# Patient Record
Sex: FEMALE | Race: WHITE | NOT HISPANIC OR LATINO | Employment: UNEMPLOYED | URBAN - METROPOLITAN AREA
[De-identification: names, ages, dates, MRNs, and addresses within clinical notes are randomized per-mention and may not be internally consistent; named-entity substitution may affect disease eponyms.]

---

## 2019-05-01 ENCOUNTER — HOSPITAL ENCOUNTER (OUTPATIENT)
Dept: CARDIOLOGY | Facility: HOSPITAL | Age: 34
Discharge: HOME | End: 2019-05-01
Attending: NURSE PRACTITIONER

## 2020-05-21 ENCOUNTER — OFFICE VISIT CONVERTED (OUTPATIENT)
Dept: NEUROLOGY | Facility: CLINIC | Age: 35
End: 2020-05-21
Attending: PSYCHIATRY & NEUROLOGY

## 2020-06-15 ENCOUNTER — HOSPITAL ENCOUNTER (OUTPATIENT)
Dept: MRI IMAGING | Facility: HOSPITAL | Age: 35
Discharge: HOME | End: 2020-06-15
Attending: PSYCHIATRY & NEUROLOGY

## 2020-06-15 LAB
CREAT BLD-MCNC: 0.5 MG/DL (ref 0.6–1.4)
GFR SERPLBLD BASED ON 1.73 SQ M-ARVRAT: >60 ML/MIN/{1.73_M2}

## 2020-06-24 ENCOUNTER — OFFICE VISIT CONVERTED (OUTPATIENT)
Dept: NEUROLOGY | Facility: CLINIC | Age: 35
End: 2020-06-24
Attending: PSYCHIATRY & NEUROLOGY

## 2020-06-24 ENCOUNTER — HOSPITAL ENCOUNTER (OUTPATIENT)
Dept: LAB | Facility: HOSPITAL | Age: 35
Discharge: HOME | End: 2020-06-24
Attending: PSYCHIATRY & NEUROLOGY

## 2020-06-24 LAB — VIT B12 SERPL-MCNC: 233 PG/ML (ref 211–911)

## 2020-06-25 LAB
CONV IMMUNOGLOBULIN G (IGG): 1097 MG/DL (ref 586–1602)
CONV IMMUNOGLOBULIN M (IGM): 53 MG/DL (ref 26–217)
IGA SERPL-MCNC: 605 MG/DL (ref 87–352)
PROT PATTERN SERPL IFE-IMP: ABNORMAL

## 2020-06-27 LAB — COPPER SERPL-MCNC: 141 UG/DL (ref 72–166)

## 2020-10-06 ENCOUNTER — HOSPITAL ENCOUNTER (OUTPATIENT)
Dept: CARDIOLOGY | Facility: HOSPITAL | Age: 35
Discharge: HOME | End: 2020-10-06

## 2021-05-10 NOTE — H&P
History and Physical      Patient Name: Silvana Lee   Patient ID: [id number]   Sex: Female   YOB: 1985    Referring Provider: Robe PEREZ    Visit Date: May 21, 2020    Provider: Aniceto Dyson MD   Location: [facility]   Location Address: [address] [place] [address]  [id number]   Location Phone: [phone]          Chief Complaint     New pt visit for neuralgia.       History Of Present Illness  Silvana Lee is a 35 year old /White female who presents today to [facility] today referred from Robe PEREZ.      35-year-old woman evaluated for episodes of gait abnormalities.  She states that the first spell occurred October 2018 in which she had pain in her feet and legs while walking and it lasted for 1 to 2 weeks.  It was painful for the sheets to touch her feet and legs.  There is no numbness.  She had a hard time walking.  She states that she had another spell February 19, 2019 that lasted for 3 months.  She states it again affected her legs and feet and she could not hardly walk and she had blood coordination.  She had no burning and tingling sensation at that time.  She states in April 1 week she started having the same symptoms again in her feet and now this time her hands.  She has had a burning sensations in her hands and it stayed there.  Is numbness and burning sensations as well in her feet and the knees below.  At times there is numbness in her abdomen that comes and goes.  She states that her gait has gotten worse.  Since April 19 she has difficulty in walking.  She has a wide-based gait she is afraid to go up and down the stairs because she is unsteady.  She has difficulty getting in and out of car as well as the chair.  She has no bowel or bladder involvement.    She was thought to have neuropathy.  She states that she has some word finding difficulty and sometimes the words are jumbled in her head.  She has had no  blindness, double vision, slurring of speech.       Past Medical History  Asthma; Hypertension, Benign Essential; Leg pain; Neuropathy; Seasonal allergies         Past Surgical History  Cesarian Section; Hernia; Tonsillectomy         Medication List  clonidine 0.2 mg/24 hr transdermal patch weekly; Cymbalta 60 mg oral capsule,delayed release(DR/EC); folic acid oral; hydroxyzine HCl 50 mg/mL intramuscular solution; lisinopril 20 mg oral tablet; metoprolol succinate 25 mg oral tablet extended release 24 hr; Mirena 20 mcg/24 hours (5 yrs) 52 mg intrauterine intrauterine device; Vitamin D2 1,250 mcg (50,000 unit) oral capsule; Xarelto 20 mg oral tablet; Zoloft 100 mg oral tablet; Zoloft 50 mg oral tablet         Allergy List  NO KNOWN DRUG ALLERGIES         Family Medical History  Heart Disease; Cancer, Unspecified; Family history of cancer; Family history of heart disease         Social History  Alcohol (Current some day); Homemaker; lives with children; lives with spouse; ; Recreational Drug Use (Never); Tobacco (Current every day)         Review of Systems  · Constitutional  o Denies  o : chills, excessive sweating, fatigue, fever, sycope/passing out, weight gain, weight loss  · Eyes  o Denies  o : changes in vision, blurry vision, double vision  · HENT  o Denies  o : loss of hearing, ringing in the ears, ear aches, sore throat, nasal congestion, sinus pain, nose bleeds, seasonal allergies  · Cardiovascular  o Admits  o : anemia  o Denies  o : blood clots, swollen legs, easy burising or bleeding, transfusions  · Respiratory  o Denies  o : shortness of breath, dry cough, productive cough, pneumonia, COPD  · Gastrointestinal  o Denies  o : difficulty swallowing, reflux  · Genitourinary  o Denies  o : incontinence  · Neurologic  o Admits  o : tremor, loss of balance, dizziness/vertigo, difficulty with sleep, numbness/tingling/paresthesia , difficulty with coordination, weakness  o Denies  o : headache, seizure,  stroke, falls, difficulty with dexterity  · Musculoskeletal  o Admits  o : muscle aches, joint pain, weakness, spasms  o Denies  o : neck stiffness/pain, swollen lymph nodes, sciatica, pain radiating in arm, pain radiating in leg, low back pain  · Endocrine  o Denies  o : diabetes, thyroid disorder  · Psychiatric  o Admits  o : anxiety, depression      Vitals  Date Time BP Position Site L\R Cuff Size HR RR TEMP (F) WT  HT  BMI kg/m2 BSA m2 O2 Sat HC       05/21/2020 02:27 PM        97.4               Physical Examination     She is alert, fluent, phasic, follows commands well.  Optic disks are normal, visual fields are full, EOMs full in all directions gaze, facial strength is full, soft palate elevation and tongue normal.  There is no weakness of the upper or lower extremities with muscle testing.  There is a tremor noted hands extended and finger-to-nose testing.  There is no problems with tapping her upper extremities but tapping is slow in her feet.  She has some incoordination.  Reflexes are symmetrical in the biceps, triceps and patellar's and there is no ankle clonus.  Plantars are flexors.  Sensation is abnormal with sensory level in the neck.  Vibration is decreased to the ankles.  Station and gait she walks with a wide-based gait.  She has difficulty getting out of the examining table and she is spastic.  She walks with a spastic gait.  She is able to tiptoe, heel walk and fernadno.  Heart is regular through normal and rate           Assessment  · Multiple sclerosis     340/G35  She has symptoms of central nervous system disease. I will do an MRI of the brain, cervical and thoracic spine with and without contrast and I will see her again in 2 weeks time for follow-up. I discussed with her that she should not be taking hot showers or expose herself to heat. I discussed with her about the diagnosis of multiple sclerosis is the working diagnosis.    45 minutes was spent for this moderate complexity visit and  more than half the time was spent face-to-face with the patient for examination, counseling, planning and testing.    Problems Reconciled  Plan  · Orders  o MRI brain wo then w contrast (77653) - 340/G35 - 05/21/2020  o MRI cervical spine wo then w contrast (02774) - 340/G35 - 05/21/2020  o MRI thoracic spine wo then w contrast (98362) - 340/G35 - 05/21/2020  · Medications  o Medications have been Reconciled  o Transition of Care or Provider Policy  · Instructions  o Encouraged to follow-up with Primary Care Provider for preventative care.  o Follow Up in 2 weeks.            Electronically Signed by: Aniceto Dyson MD -Author on May 21, 2020 04:08:53 PM

## 2021-05-13 NOTE — PROGRESS NOTES
Progress Note      Patient Name: Silvana Lee   Patient ID: [id number]   Sex: Female   YOB: 1985    Referring Provider: Robe PEREZ    Visit Date: June 24, 2020    Provider: Aniceto Dyson MD   Location: [facility]   Location Address: [address] [place] [address]  [id number]   Location Phone: [phone]          Chief Complaint     2 wk f/u post MRI's for MS.       History Of Present Illness  Silvana Lee is a 35 year old /White female who presents today to [facility] today referred from Robe PEREZ.      35-year-old woman here for follow-up of her MRI of the brain, cervical spine and thoracic spine.  I reviewed the reports and they are unremarkable.  Her main complaint is numbness and tingling in her arms, body, legs.  She has good days and bad days.  Today's a good day she can walk.  Other than today she has problems with her gait and that she feels unsteady.  She has no coordination problems today.  She states that she had 36 vitals taken by a hematologist oncologist 2 years ago and nothing was found.  She does not know if she has had B12 levels performed.       Past Medical History  Asthma; Hypertension, Benign Essential; Leg pain; Multiple Sclerosis; Neuropathy; Seasonal allergies         Past Surgical History  Cesarian Section; Hernia; Tonsillectomy         Medication List  clonidine 0.2 mg/24 hr transdermal patch weekly; Cymbalta 60 mg oral capsule,delayed release(DR/EC); folic acid oral; hydroxyzine HCl 50 mg/mL intramuscular solution; lisinopril 20 mg oral tablet; metoprolol succinate 25 mg oral tablet extended release 24 hr; Mirena 20 mcg/24 hours (5 yrs) 52 mg intrauterine intrauterine device; Vitamin D2 1,250 mcg (50,000 unit) oral capsule; Xarelto 20 mg oral tablet; Zoloft 100 mg oral tablet; Zoloft 50 mg oral tablet         Allergy List  NO KNOWN DRUG ALLERGIES         Family Medical History  Heart Disease; Cancer,  Unspecified; Family history of cancer; Family history of heart disease         Social History  Alcohol (Current some day); Homemaker; lives with children; lives with spouse; ; Recreational Drug Use (Never); Tobacco (Current every day)         Review of Systems  · Constitutional  o Admits  o : excessive sweating, fatigue  o Denies  o : chills, fever, sycope/passing out, weight gain, weight loss  · Eyes  o Denies  o : changes in vision, blurry vision, double vision  · HENT  o Admits  o : seasonal allergies  o Denies  o : loss of hearing, ringing in the ears, ear aches, sore throat, nasal congestion, sinus pain, nose bleeds  · Cardiovascular  o Denies  o : blood clots, swollen legs, anemia, easy burising or bleeding, transfusions  · Respiratory  o Denies  o : shortness of breath, dry cough, productive cough, pneumonia, COPD  · Gastrointestinal  o Denies  o : difficulty swallowing, reflux  · Genitourinary  o Denies  o : incontinence  · Neurologic  o Admits  o : tremor, loss of balance, dizziness/vertigo, numbness/tingling/paresthesia , difficulty with coordination, weakness  o Denies  o : headache, seizure, stroke, falls, difficulty with sleep, difficulty with dexterity  · Musculoskeletal  o Admits  o : weakness, spasms  o Denies  o : neck stiffness/pain, swollen lymph nodes, muscle aches, joint pain, sciatica, pain radiating in arm, pain radiating in leg, low back pain  · Endocrine  o Denies  o : diabetes, thyroid disorder  · Psychiatric  o Admits  o : anxiety, depression      Vitals  Date Time BP Position Site L\R Cuff Size HR RR TEMP (F) WT  HT  BMI kg/m2 BSA m2 O2 Sat HC       06/24/2020 01:54 PM        96.2               Physical Examination     She is alert, fluent, phasic, follows commands well.  There is no weakness of the upper or lower extremities in these muscle testing.  Reflexes are normoactive and symmetrical biceps, triceps, patellar's and absent in the ankles.  Sensation is decreased to pinprick  in the lower up to the thoracic spine.  Upper extremity there is a stocking distribution to the elbows.  Station and gait she is able to tiptoe, heel walk, a has slight difficulty with tandem today.  Tapping in both hands is intact and rapid altering movements are intact.  Tapping in the left foot is slower than the right foot.           Assessment  · Gait disturbance     781.2/R26.9  · Numbness and tingling       Anesthesia of skin     782.0/R20.0  Paresthesia of skin     782.0/R20.2  I told her that I will send her to [facility] peripheral neuropathy clinic since I am unable to make a diagnosis. I will obtain B12, folate, copper level and immunofixation electrophoresis. She does not want other work-up to be performed at this time. She is going to see a rheumatologist as well.    25 minutes was spent for this moderate complexity visit more than half the time spent face-to-face with the patient for examination, counseling, planning and recommendations    Problems Reconciled  Plan  · Orders  o NEUROLOGY CONSULTATION. (NEURO) - 781.2/R26.9, 782.0/R20.0, 782.0/R20.2 - 06/24/2020   [facility] peripheral neuropathy clinic to see   o Vitamin B12 level (67373) - 781.2/R26.9, 782.0/R20.0, 782.0/R20.2 - 06/24/2020  o Immunofixation electrophoresis; serum (87190) - 781.2/R26.9, 782.0/R20.0, 782.0/R20.2 - 06/24/2020  o Copper level (96891) - 781.2/R26.9, 782.0/R20.0, 782.0/R20.2 - 06/24/2020  · Medications  o Medications have been Reconciled  o Transition of Care or Provider Policy  · Instructions  o Encouraged to follow-up with Primary Care Provider for preventative care.            Electronically Signed by: Aniceto Dyson MD -Author on June 24, 2020 02:52:20 PM

## 2021-05-15 VITALS — TEMPERATURE: 96.2 F

## 2021-05-15 VITALS — TEMPERATURE: 97.4 F

## 2022-03-16 ENCOUNTER — LAB (OUTPATIENT)
Dept: LAB | Facility: HOSPITAL | Age: 37
End: 2022-03-16

## 2022-03-16 ENCOUNTER — TRANSCRIBE ORDERS (OUTPATIENT)
Dept: LAB | Facility: HOSPITAL | Age: 37
End: 2022-03-16

## 2022-03-16 DIAGNOSIS — Z97.5 PRESENCE OF INTRAUTERINE CONTRACEPTIVE DEVICE: Primary | ICD-10-CM

## 2022-03-16 DIAGNOSIS — Z97.5 PRESENCE OF INTRAUTERINE CONTRACEPTIVE DEVICE: ICD-10-CM

## 2022-03-16 LAB — HCG SERPL QL: NEGATIVE

## 2022-03-16 PROCEDURE — 36415 COLL VENOUS BLD VENIPUNCTURE: CPT

## 2022-03-16 PROCEDURE — 84703 CHORIONIC GONADOTROPIN ASSAY: CPT

## 2022-07-12 ENCOUNTER — APPOINTMENT (OUTPATIENT)
Dept: ONCOLOGY | Facility: HOSPITAL | Age: 37
End: 2022-07-12

## 2023-10-04 ENCOUNTER — OFFICE VISIT (OUTPATIENT)
Dept: SLEEP MEDICINE | Facility: HOSPITAL | Age: 38
End: 2023-10-04
Payer: COMMERCIAL

## 2023-10-04 VITALS
BODY MASS INDEX: 31.02 KG/M2 | WEIGHT: 193 LBS | HEIGHT: 66 IN | HEART RATE: 66 BPM | DIASTOLIC BLOOD PRESSURE: 80 MMHG | OXYGEN SATURATION: 99 % | SYSTOLIC BLOOD PRESSURE: 122 MMHG

## 2023-10-04 DIAGNOSIS — E66.9 OBESITY (BMI 30-39.9): ICD-10-CM

## 2023-10-04 DIAGNOSIS — R29.818 SUSPECTED SLEEP APNEA: Primary | ICD-10-CM

## 2023-10-04 DIAGNOSIS — R06.83 SNORING: ICD-10-CM

## 2023-10-04 DIAGNOSIS — I10 BENIGN ESSENTIAL HTN: ICD-10-CM

## 2023-10-04 DIAGNOSIS — R35.1 NOCTURIA: ICD-10-CM

## 2023-10-04 PROCEDURE — G0463 HOSPITAL OUTPT CLINIC VISIT: HCPCS

## 2023-10-04 RX ORDER — ERGOCALCIFEROL 1.25 MG/1
CAPSULE ORAL
COMMUNITY
Start: 2023-07-12

## 2023-10-04 RX ORDER — GABAPENTIN 600 MG/1
300 TABLET ORAL 3 TIMES DAILY
COMMUNITY

## 2023-10-04 RX ORDER — CALCIUM CARBONATE 300MG(750)
1 TABLET,CHEWABLE ORAL DAILY
COMMUNITY

## 2023-10-04 NOTE — PROGRESS NOTES
Sleep Consultation    Patient Name: Silvana Lee  Age/Sex: 38 y.o. female  : 1985  MRN: [id number]    Date of Encounter Visit: 10/04/2023  Encounter Provider: Pamela Saab MD  Referring Provider: Pamela Saab MD  Place of Service: [facility]  Patient Care Team:  Julia Mccarty APRN as PCP - General (Family Medicine)    Subjective:     Reason for Consult: SAWYER    History of Present Illness:  Silvana Lee is a 38 y.o. female is here for evaluation of SAWYER .    Patient complains of daytime fatigue and sleepiness with an Gilby Sleepiness Scale (ESS) of 10.  Patient complains of daytime fatigue and sleepiness with worsening symptoms with a 30 pounds weight gain  Loud snoring neuropathy and was witnessed apnea waking up gasping for breath, frequently tripping at night, some leg discomfort was possible restless leg syndrome  Patient has difficulty staying asleep with frequent awakenings and positive nocturia  Patient denies any cataplexy, sleep paralysis or other symptoms to suggest narcolepsy.  Patient denies any parasomnias.  Denies any history of seizure disorder or recent head trauma.  Patient spends adequate amount of time in bed with no evidence of sleep restriction or improper sleep hygiene.  Bedtime is around 9 PM wake up time 630 with 9 hours of sleep in between, sleep onset 20-30 minutes, still wakes up feeling tired.  Caffeine intake is usually 6 caffeinated beverages per day, 2 alcoholic beverages per day, positive smoking, no illicit substance abuse comorbidities include: Hypertension, anxiety and depression    Review of Systems:   A twelve-system review was conducted and was negative except for the following: Fatigue, anxiety and depression       Past Medical History:  Past Medical History:   Diagnosis Date    Anxiety     Blood clotting disorder     Depression     Hyperlipemia     Hypertension     Neuropathy        Past Surgical History:   Procedure Laterality Date      SECTION      HERNIA MESH REMOVAL      TONSILLECTOMY         Home Medications:     Current Outpatient Medications:     cetirizine (zyrTEC) 10 MG tablet, Take 1 tablet by mouth Daily., Disp: , Rfl:     cloNIDine (CATAPRES) 0.2 MG tablet, Take 1 tablet by mouth Daily., Disp: , Rfl:     folic acid (FOLVITE) 1 MG tablet, Take 1 tablet by mouth Daily., Disp: , Rfl:     gabapentin (NEURONTIN) 600 MG tablet, Take 0.5 tablets by mouth 3 (Three) Times a Day., Disp: , Rfl:     Levonorgestrel (MIRENA) 20 MCG/DAY intrauterine device IUD, , Disp: , Rfl:     lisinopril (PRINIVIL,ZESTRIL) 20 MG tablet, Take 1 tablet by mouth Daily., Disp: , Rfl:     Magnesium 400 MG tablet, Take 1 tablet by mouth Daily., Disp: , Rfl:     magnesium oxide (MAG-OX) 400 MG tablet, Take  by mouth., Disp: , Rfl:     metoprolol succinate XL (TOPROL-XL) 25 MG 24 hr tablet, Take 1 tablet by mouth Daily., Disp: , Rfl:     PROBIOTIC PRODUCT PO, Take  by mouth., Disp: , Rfl:     rivaroxaban (XARELTO) 20 MG tablet, Take 1 tablet by mouth Daily., Disp: 30 tablet, Rfl: 5    rosuvastatin (CRESTOR) 5 MG tablet, Take 1 tablet by mouth Daily., Disp: 30 tablet, Rfl: 11    sertraline (ZOLOFT) 100 MG tablet, Take 1.5 tablets by mouth Daily., Disp: 45 tablet, Rfl: 5    sertraline (ZOLOFT) 50 MG tablet, 1 tablet., Disp: , Rfl:     VIT B12-METHIONINE-INOS-CHOL IM, Inject  into the appropriate muscle as directed by prescriber., Disp: , Rfl:     vitamin D (ERGOCALCIFEROL) 1.25 MG (53657 UT) capsule capsule, TAKE 1 CAPSULE BY MOUTH ONCE WEEKLY AS DIRECTED, Disp: , Rfl:     Allergies:  No Known Allergies    Past Social History:  Social History     Socioeconomic History    Marital status:    Tobacco Use    Smoking status: Every Day     Types: Cigarettes    Smokeless tobacco: Current   Vaping Use    Vaping Use: Never used   Substance and Sexual Activity    Alcohol use: Yes    Drug use: Never    Sexual activity: Defer       Past Family History:  History  "reviewed. No pertinent family history.  No family history of sleep apnea  Objective:        Vital Signs:   Visit Vitals  /80   Pulse 66   Ht 167 cm (65.75\")   Wt 87.5 kg (193 lb)   SpO2 99%   BMI 31.39 kg/m²     Wt Readings from Last 3 Encounters:   10/04/23 87.5 kg (193 lb)   09/28/23 79.4 kg (175 lb)     Neck Circumference: 16.5 inches    Physical Exam:   GEN:  No acute distress, alert, cooperative, well developed   EYES:   Sclerae clear. No icterus. PERRL. Normal EOM  ENT:   External ears/nose normal, no oral lesions, no thrush, mucous membranes moist, Septum midline. Mallampati III airway. With slightly swollen uvula    NECK:  Supple, midline trachea, no JVD  LUNGS: Normal chest on inspection, CTAB, no wheezes. No rhonchi. No crackles. Respirations regular, even and unlabored.   CV:  Regular rhythm and rate. Normal S1/S2. No murmurs, gallops, or rubs noted.  ABD:  Soft, nontender and nondistended. Normal bowel sounds. No guarding  EXT:  Moves all extremities well. No cyanosis. No redness. No edema.   Skin: Dry, intact, no bleeding      Diagnostic Data:  No prior studies to review     Assessment and Plan:       ICD-10-CM ICD-9-CM   1. Suspected sleep apnea  R29.818 781.99   2. Snoring  R06.83 786.09   3. Nocturia  R35.1 788.43   4. Obesity (BMI 30-39.9)  E66.9 278.00   5. Benign essential HTN  I10 401.1       Recommendations:     Patient is agreeable for sleep testing and for CPAP treatment  We will order a home sleep study, will initiate the treatment even for a mild case of sleep apnea since the patient is not symptomatic    Patient was educated in depth about SAWYER and cardiovascular consequences if left untreated, including but not limited to CHF, CAD, arrhythmias, CVA, and/or HTN. Education also provided about the diagnostic tools for SAWYER, including the polysomnography and the treatment modalities, including the CPAP.     If patient has obstructive sleep apnea the recommend treatment is CPAP and will " start CPAP and patient will follow up within 31-90 days after starting CPAP for compliance review.   Will address alternative treatment option if intolerant to CPAP     Adherence to the CPAP is a key factor in successful treatment of SAWYER and the patient was encouraged to contact us in case of problem with the CPAP or the mask that can limit the tolerance of the compliance with the therapy.    Patient was educated about the impact of obesity on sleep apnea and the benefit of weight loss and weight loss was recommended    Orders Placed This Encounter   Procedures    Home Sleep Study     No orders of the defined types were placed in this encounter.     Return in about 3 months (around 1/4/2024).    Pamela Saab MD   [facility]   10/04/23  13:52 EDT    Dictated utilizing Dragon dictation

## 2023-10-25 ENCOUNTER — HOSPITAL ENCOUNTER (OUTPATIENT)
Dept: SLEEP MEDICINE | Facility: HOSPITAL | Age: 38
End: 2023-10-25
Payer: COMMERCIAL

## 2023-10-25 DIAGNOSIS — R29.818 SUSPECTED SLEEP APNEA: ICD-10-CM

## 2023-10-25 DIAGNOSIS — E66.9 OBESITY (BMI 30-39.9): ICD-10-CM

## 2023-10-25 DIAGNOSIS — R06.83 SNORING: ICD-10-CM

## 2023-10-25 DIAGNOSIS — I10 BENIGN ESSENTIAL HTN: ICD-10-CM

## 2023-10-25 DIAGNOSIS — R35.1 NOCTURIA: ICD-10-CM

## 2023-10-25 PROCEDURE — 95806 SLEEP STUDY UNATT&RESP EFFT: CPT

## 2023-11-03 DIAGNOSIS — G47.33 OSA (OBSTRUCTIVE SLEEP APNEA): Primary | ICD-10-CM

## 2023-11-13 ENCOUNTER — TELEPHONE (OUTPATIENT)
Dept: SLEEP MEDICINE | Facility: HOSPITAL | Age: 38
End: 2023-11-13
Payer: COMMERCIAL

## 2024-01-23 ENCOUNTER — OFFICE VISIT (OUTPATIENT)
Dept: SLEEP MEDICINE | Facility: HOSPITAL | Age: 39
End: 2024-01-23
Payer: COMMERCIAL

## 2024-01-23 VITALS
HEIGHT: 66 IN | HEART RATE: 63 BPM | OXYGEN SATURATION: 98 % | SYSTOLIC BLOOD PRESSURE: 153 MMHG | DIASTOLIC BLOOD PRESSURE: 86 MMHG | BODY MASS INDEX: 30.53 KG/M2 | WEIGHT: 190 LBS

## 2024-01-23 DIAGNOSIS — I10 BENIGN ESSENTIAL HTN: ICD-10-CM

## 2024-01-23 DIAGNOSIS — E66.9 OBESITY (BMI 30-39.9): ICD-10-CM

## 2024-01-23 DIAGNOSIS — G47.34 SLEEP RELATED HYPOXIA: ICD-10-CM

## 2024-01-23 DIAGNOSIS — G47.33 OSA ON CPAP: Primary | ICD-10-CM

## 2024-01-23 PROCEDURE — G0463 HOSPITAL OUTPT CLINIC VISIT: HCPCS

## 2024-01-23 RX ORDER — CYANOCOBALAMIN, ISOPROPYL ALCOHOL 1000MCG/ML
1000 KIT INJECTION
COMMUNITY
Start: 2023-10-24

## 2024-01-23 RX ORDER — CYANOCOBALAMIN 1000 UG/ML
INJECTION, SOLUTION INTRAMUSCULAR; SUBCUTANEOUS
COMMUNITY
Start: 2023-10-24

## 2024-01-23 NOTE — PROGRESS NOTES
Sleep Disorder Follow Up    Patient Name: Silvana Lee  Age/Sex: 38 y.o. female  : 1985  MRN: [id number]    Date of Encounter Visit: 24   Referring Provider: No ref. provider found  Place of Service: [facility]  Patient Care Team:  Julia Mccarty APRN as PCP - General (Family Medicine)    PROBLEM LIST:  SAWYER  Sleep-related hypoxemia  Obesity  Hypertension    History of Present Illness:  Silvana Lee is a 38 y.o. female who is here for follow up on SAWYER. Patient was last seen in the office on 10/4/2023.  Since last visit, patient had a home sleep study that was done on 10/25/2023 that showed mild sleep apnea with AHI of 8.8 however with severe supine index of 31.8 along with significant hypoxemia during the cluster and patient was started on auto CPAP 6-20  Patient had a follow-up overnight oximetry on the CPAP that showed good oxygenation throughout after excluding the artifact over the first third of that overnight oximetry  .  Patient uses machine every night with no complaints from the mask or the pressure.  She is sleeping deeper , less sleep fragmentation, she still tosses and turn at night, but she is gradually adapting   Patient uses a fullface mask, which does  fit well. Patient mild to moderate air leak.  Positive for dry mouth.   Patient sleeps better and has a deeper sleep with the machine and feels more energy during the day time.  Currently on 6-20 cm H20.  [place] Sleepiness Scale (ESS) is 5.  Compliance download was reviewed and documented below.  Weight is down by 3 pounds since last visit.  Other comorbidities include hypertension, anxiety, and depression    Review of Systems:   A ten-system review was conducted and was negative except for the following: Dry mouth, abdominal bloating, anxiety and depression.        Past Medical History:  Past medical, surgical, social, and family history, except as mentioned above, was unchanged from the last visit.     Past  Medical History:   Diagnosis Date    Anxiety     Blood clotting disorder     Depression     Hyperlipemia     Hypertension     Neuropathy        Past Surgical History:   Procedure Laterality Date     SECTION      HERNIA MESH REMOVAL      TONSILLECTOMY         Home Medications:     Current Outpatient Medications:     cetirizine (zyrTEC) 10 MG tablet, Take 1 tablet by mouth Daily., Disp: , Rfl:     cloNIDine (CATAPRES) 0.2 MG tablet, Take 1 tablet by mouth Daily., Disp: , Rfl:     Cyanocobalamin (B-12 Compliance Injection) 1000 MCG/ML kit, 1 mL., Disp: , Rfl:     cyanocobalamin 1000 MCG/ML injection, INJECT 1 VIAL INTRAMUSCULARLY AS DIRECTED FOR 30 DAYS, Disp: , Rfl:     folic acid (FOLVITE) 1 MG tablet, Take 1 tablet by mouth Daily., Disp: , Rfl:     gabapentin (NEURONTIN) 600 MG tablet, Take 0.5 tablets by mouth 3 (Three) Times a Day., Disp: , Rfl:     Levonorgestrel (MIRENA) 20 MCG/DAY intrauterine device IUD, , Disp: , Rfl:     lisinopril (PRINIVIL,ZESTRIL) 20 MG tablet, Take 1 tablet by mouth Daily., Disp: , Rfl:     Magnesium 400 MG tablet, Take 1 tablet by mouth Daily., Disp: , Rfl:     metoprolol succinate XL (TOPROL-XL) 25 MG 24 hr tablet, Take 1 tablet by mouth Daily., Disp: , Rfl:     PROBIOTIC PRODUCT PO, Take  by mouth., Disp: , Rfl:     rosuvastatin (CRESTOR) 5 MG tablet, Take 1 tablet by mouth Daily., Disp: 30 tablet, Rfl: 11    VIT B12-METHIONINE-INOS-CHOL IM, Inject  into the appropriate muscle as directed by prescriber., Disp: , Rfl:     vitamin D (ERGOCALCIFEROL) 1.25 MG (25990 UT) capsule capsule, TAKE 1 CAPSULE BY MOUTH ONCE WEEKLY AS DIRECTED, Disp: , Rfl:     magnesium oxide (MAG-OX) 400 MG tablet, Take  by mouth., Disp: , Rfl:     rivaroxaban (XARELTO) 20 MG tablet, Take 1 tablet by mouth Daily., Disp: 30 tablet, Rfl: 5    sertraline (ZOLOFT) 100 MG tablet, Take 1.5 tablets by mouth Daily., Disp: 45 tablet, Rfl: 5    sertraline (ZOLOFT) 50 MG tablet, 1 tablet., Disp: , Rfl:  "    Allergies:  No Known Allergies    Past Social History:  Social History     Socioeconomic History    Marital status:    Tobacco Use    Smoking status: Every Day     Types: Cigarettes    Smokeless tobacco: Current   Vaping Use    Vaping Use: Never used   Substance and Sexual Activity    Alcohol use: Yes    Drug use: Never    Sexual activity: Defer       Past Family History:  History reviewed. No pertinent family history.      Objective:        Vital Signs:   Visit Vitals  /86   Pulse 63   Ht 167 cm (65.75\")   Wt 86.2 kg (190 lb)   SpO2 98%   BMI 30.90 kg/m²     Wt Readings from Last 3 Encounters:   01/23/24 86.2 kg (190 lb)   10/04/23 87.5 kg (193 lb)   09/28/23 79.4 kg (175 lb)          Physical Exam:   GEN:  No acute distress, alert, cooperative, well developed   EYES:   Sclerae clear. No icterus. PERRL. Normal EOM  ENT:   External ears/nose normal, no oral lesions, no thrush, mucous membranes moist, Septum midline. Mallampati III airway. With slightly swollen uvula    NECK:  Supple, midline trachea, no JVD  LUNGS: Normal chest on inspection, CTAB, no wheezes. No rhonchi. No crackles. Respirations regular, even and unlabored.   CV:  Regular rhythm and rate. Normal S1/S2. No murmurs, gallops, or rubs noted.  ABD:  Soft, nontender and nondistended. Normal bowel sounds. No guarding  EXT:  Moves all extremities well. No cyanosis. No redness. No edema.   Skin: Dry, intact, no bleeding    Diagnostic Data:  Home sleep study done on 10/25/2023       Respiratory Disturbance Events:     Reported weight on the night of the study was 87.5 kg with a BMI of 31.4  Total recording time 435.8-minute with a monitoring time 352.8 minutes  Respiratory summary was positive for mild overall sleep apnea with AHI of 8.8 however with severe supine index of 31.8 associated with significant hypoxemia.  A edmund desaturation was down to 71% with 29 minutes spent in the hypoxemic range and these were no artifacts.  Percentage of " snoring 32.9%  Heart rate was within normal range  Impression:    Mild sleep apnea with severe supine component  Sleep-related hypoxemia with significant desaturation in the supine position  Plan:  Despite the fact that this is a mild sleep apnea, it was pretty severe with significant hypoxemia in the supine sleep and in a patient who is symptomatic and treatment is still recommended  Initiate auto CPAP 6-20  Overnight oximetry on the CPAP    Overnight oximetry on CPAP/room air 12/15/2023      Compliance download from 12/12/2023 - 1/10/2024 showed 97% adherence with average nightly use of 5 hours and 26 minutes  Patient is on auto CPAP 6-20 with a mean/95th percentile pressure of 11.8/15.1 cm H2O  Air leak is mild to moderate with a median/95th percentile of 10.9/24.8 L/min  SAWYER is well-controlled with residual AHI of 1.0      Assessment and Plan:       ICD-10-CM ICD-9-CM   1. SAWYER on CPAP  G47.33 327.23   2. Sleep related hypoxia  G47.34 327.24   3. Benign essential HTN  I10 401.1   4. Obesity (BMI 30-39.9)  E66.9 278.00       Recommendations:   I did review with the patient in details the results of the home sleep study and pointed out all the abnormal finding, I also shared with the patient my concern that the home sleep study probably underestimated the severity of her sleep apnea overall  We did review the download from the CPAP machine as well as the overnight oximetry  Patient is compliant with the CPAP as evident from the compliance download  Patient is getting both clinical and subjective benefit from the use of the CPAP machine  The CPAP machine is effective in controlling the underlying sleep apnea  CPAP is strongly recommended to be continued  Patient to continue work on the weight loss  Will adjust the pressure to the new range of 10-20  Continue with a yearly follow-up or sooner as needed     Orders Placed This Encounter   Procedures    PAP Therapy    SCANNED - PULMONARY RESULTS    SCANNED - PULMONARY  RESULTS     No orders of the defined types were placed in this encounter.    Return in about 1 year (around 1/23/2025).    Pamela Saab MD   [facility]   01/23/24  09:32 EST    Dictated utilizing Dragon dictation

## 2025-02-07 ENCOUNTER — TRANSCRIBE ORDERS (OUTPATIENT)
Dept: ADMINISTRATIVE | Facility: HOSPITAL | Age: 40
End: 2025-02-07
Payer: COMMERCIAL

## 2025-02-07 DIAGNOSIS — R92.30 DENSE BREAST: Primary | ICD-10-CM

## 2025-02-07 DIAGNOSIS — N64.4 MASTODYNIA: ICD-10-CM

## 2025-05-19 ENCOUNTER — LAB (OUTPATIENT)
Dept: LAB | Facility: HOSPITAL | Age: 40
End: 2025-05-19
Payer: COMMERCIAL

## 2025-05-19 ENCOUNTER — TRANSCRIBE ORDERS (OUTPATIENT)
Dept: LAB | Facility: HOSPITAL | Age: 40
End: 2025-05-19
Payer: COMMERCIAL

## 2025-05-19 DIAGNOSIS — M79.2 NEURITIS: ICD-10-CM

## 2025-05-19 DIAGNOSIS — G60.9 PERIPHERAL NEUROPATHY, IDIOPATHIC: ICD-10-CM

## 2025-05-19 DIAGNOSIS — G60.9 PERIPHERAL NEUROPATHY, IDIOPATHIC: Primary | ICD-10-CM

## 2025-05-19 LAB — GLUCOSE BLD-MCNC: 123 MG/DL (ref 74–155)

## 2025-05-19 PROCEDURE — 85613 RUSSELL VIPER VENOM DILUTED: CPT

## 2025-05-19 PROCEDURE — 85705 THROMBOPLASTIN INHIBITION: CPT

## 2025-05-19 PROCEDURE — 86611 BARTONELLA ANTIBODY: CPT

## 2025-05-19 PROCEDURE — 86788 WEST NILE VIRUS AB IGM: CPT

## 2025-05-19 PROCEDURE — 85025 COMPLETE CBC W/AUTO DIFF WBC: CPT

## 2025-05-19 PROCEDURE — 83695 ASSAY OF LIPOPROTEIN(A): CPT

## 2025-05-19 PROCEDURE — 84443 ASSAY THYROID STIM HORMONE: CPT

## 2025-05-19 PROCEDURE — 86769 SARS-COV-2 COVID-19 ANTIBODY: CPT

## 2025-05-19 PROCEDURE — 84439 ASSAY OF FREE THYROXINE: CPT

## 2025-05-19 PROCEDURE — 85670 THROMBIN TIME PLASMA: CPT

## 2025-05-19 PROCEDURE — 82607 VITAMIN B-12: CPT

## 2025-05-19 PROCEDURE — 86431 RHEUMATOID FACTOR QUANT: CPT

## 2025-05-19 PROCEDURE — 86618 LYME DISEASE ANTIBODY: CPT

## 2025-05-19 PROCEDURE — 86753 PROTOZOA ANTIBODY NOS: CPT

## 2025-05-19 PROCEDURE — 82306 VITAMIN D 25 HYDROXY: CPT

## 2025-05-19 PROCEDURE — 85732 THROMBOPLASTIN TIME PARTIAL: CPT

## 2025-05-19 PROCEDURE — 86666 EHRLICHIA ANTIBODY: CPT

## 2025-05-19 PROCEDURE — 86038 ANTINUCLEAR ANTIBODIES: CPT

## 2025-05-19 PROCEDURE — 83036 HEMOGLOBIN GLYCOSYLATED A1C: CPT

## 2025-05-19 PROCEDURE — 86789 WEST NILE VIRUS ANTIBODY: CPT

## 2025-05-19 PROCEDURE — 83090 ASSAY OF HOMOCYSTEINE: CPT

## 2025-05-19 PROCEDURE — 36415 COLL VENOUS BLD VENIPUNCTURE: CPT

## 2025-05-19 PROCEDURE — 82746 ASSAY OF FOLIC ACID SERUM: CPT

## 2025-05-19 PROCEDURE — 82947 ASSAY GLUCOSE BLOOD QUANT: CPT

## 2025-05-19 PROCEDURE — 86757 RICKETTSIA ANTIBODY: CPT

## 2025-05-19 PROCEDURE — 86147 CARDIOLIPIN ANTIBODY EA IG: CPT

## 2025-05-19 PROCEDURE — 80061 LIPID PANEL: CPT

## 2025-05-19 PROCEDURE — 86140 C-REACTIVE PROTEIN: CPT

## 2025-05-19 PROCEDURE — 80053 COMPREHEN METABOLIC PANEL: CPT

## 2025-05-19 PROCEDURE — 81291 MTHFR GENE: CPT

## 2025-05-20 LAB
25(OH)D3 SERPL-MCNC: 37.2 NG/ML (ref 30–100)
ALBUMIN SERPL-MCNC: 4 G/DL (ref 3.5–5.2)
ALBUMIN/GLOB SERPL: 1.2 G/DL
ALP SERPL-CCNC: 66 U/L (ref 39–117)
ALT SERPL W P-5'-P-CCNC: 37 U/L (ref 1–33)
ANA SER QL IF: NEGATIVE
ANION GAP SERPL CALCULATED.3IONS-SCNC: 14 MMOL/L (ref 5–15)
AST SERPL-CCNC: 52 U/L (ref 1–32)
B BURGDOR IGG+IGM SER QL IA: NEGATIVE
BASOPHILS # BLD AUTO: 0.04 10*3/MM3 (ref 0–0.2)
BASOPHILS NFR BLD AUTO: 0.5 % (ref 0–1.5)
BILIRUB SERPL-MCNC: 0.4 MG/DL (ref 0–1.2)
BUN SERPL-MCNC: 6 MG/DL (ref 6–20)
BUN/CREAT SERPL: 11.8 (ref 7–25)
CALCIUM SPEC-SCNC: 8.9 MG/DL (ref 8.6–10.5)
CARDIOLIPIN IGA SER IA-ACNC: <9 APL U/ML (ref 0–11)
CARDIOLIPIN IGG SER IA-ACNC: <9 GPL U/ML (ref 0–14)
CARDIOLIPIN IGM SER IA-ACNC: <9 MPL U/ML (ref 0–12)
CHLORIDE SERPL-SCNC: 97 MMOL/L (ref 98–107)
CHOLEST SERPL-MCNC: 198 MG/DL (ref 0–200)
CHROMATIN AB SERPL-ACNC: <10 IU/ML (ref 0–14)
CO2 SERPL-SCNC: 27 MMOL/L (ref 22–29)
CREAT SERPL-MCNC: 0.51 MG/DL (ref 0.57–1)
CRP SERPL-MCNC: 1.13 MG/DL (ref 0–0.5)
DEPRECATED RDW RBC AUTO: 47.5 FL (ref 37–54)
EGFRCR SERPLBLD CKD-EPI 2021: 121.9 ML/MIN/1.73
EOSINOPHIL # BLD AUTO: 0.26 10*3/MM3 (ref 0–0.4)
EOSINOPHIL NFR BLD AUTO: 3.2 % (ref 0.3–6.2)
ERYTHROCYTE [DISTWIDTH] IN BLOOD BY AUTOMATED COUNT: 13.1 % (ref 12.3–15.4)
FOLATE SERPL-MCNC: 11 NG/ML (ref 4.78–24.2)
GLOBULIN UR ELPH-MCNC: 3.3 GM/DL
GLUCOSE SERPL-MCNC: 88 MG/DL (ref 65–99)
HBA1C MFR BLD: 5.2 % (ref 4.8–5.6)
HCT VFR BLD AUTO: 40.5 % (ref 34–46.6)
HCYS SERPL-MCNC: 46.8 UMOL/L (ref 0–15)
HDLC SERPL-MCNC: 41 MG/DL (ref 40–60)
HGB BLD-MCNC: 13.8 G/DL (ref 12–15.9)
IMM GRANULOCYTES # BLD AUTO: 0.03 10*3/MM3 (ref 0–0.05)
IMM GRANULOCYTES NFR BLD AUTO: 0.4 % (ref 0–0.5)
LDLC SERPL CALC-MCNC: 124 MG/DL (ref 0–100)
LDLC/HDLC SERPL: 2.92 {RATIO}
LPA SERPL-SCNC: 77.9 NMOL/L
LYMPHOCYTES # BLD AUTO: 1.92 10*3/MM3 (ref 0.7–3.1)
LYMPHOCYTES NFR BLD AUTO: 23.9 % (ref 19.6–45.3)
MCH RBC QN AUTO: 33.7 PG (ref 26.6–33)
MCHC RBC AUTO-ENTMCNC: 34.1 G/DL (ref 31.5–35.7)
MCV RBC AUTO: 99 FL (ref 79–97)
MONOCYTES # BLD AUTO: 0.27 10*3/MM3 (ref 0.1–0.9)
MONOCYTES NFR BLD AUTO: 3.4 % (ref 5–12)
NEUTROPHILS NFR BLD AUTO: 5.53 10*3/MM3 (ref 1.7–7)
NEUTROPHILS NFR BLD AUTO: 68.6 % (ref 42.7–76)
NRBC BLD AUTO-RTO: 0 /100 WBC (ref 0–0.2)
PLATELET # BLD AUTO: 241 10*3/MM3 (ref 140–450)
PMV BLD AUTO: 9.7 FL (ref 6–12)
POTASSIUM SERPL-SCNC: 4 MMOL/L (ref 3.5–5.2)
PROT SERPL-MCNC: 7.3 G/DL (ref 6–8.5)
RBC # BLD AUTO: 4.09 10*6/MM3 (ref 3.77–5.28)
SODIUM SERPL-SCNC: 138 MMOL/L (ref 136–145)
T4 FREE SERPL-MCNC: 1.26 NG/DL (ref 0.92–1.68)
TRIGL SERPL-MCNC: 187 MG/DL (ref 0–150)
TSH SERPL DL<=0.05 MIU/L-ACNC: 1.21 UIU/ML (ref 0.27–4.2)
VIT B12 BLD-MCNC: 342 PG/ML (ref 211–946)
VLDLC SERPL-MCNC: 33 MG/DL (ref 5–40)
WBC NRBC COR # BLD AUTO: 8.05 10*3/MM3 (ref 3.4–10.8)

## 2025-05-21 LAB
A PHAGOCYTOPH IGG TITR SER IF: NEGATIVE {TITER}
A PHAGOCYTOPH IGM TITR SER IF: NEGATIVE {TITER}
APTT SCREEN TO CONFIRM RATIO: 1.06 RATIO (ref 0–1.34)
B HENSELAE IGG TITR SER IF: NEGATIVE TITER
B HENSELAE IGM TITR SER IF: NEGATIVE TITER
B MICROTI IGG TITR SER: NORMAL {TITER}
B MICROTI IGM TITR SER: NORMAL {TITER}
B QUINTANA IGG TITR SER IF: NEGATIVE TITER
B QUINTANA IGM TITR SER IF: NEGATIVE TITER
CONFIRM APTT/NORMAL: 41.9 SEC (ref 0–47.6)
DRVVT SCREEN TO CONFIRM RATIO: 1.6 RATIO (ref 0.8–1.2)
E CHAFFEENSIS IGG TITR SER IF: NEGATIVE {TITER}
E CHAFFEENSIS IGM TITR SER IF: NEGATIVE {TITER}
LA 2 SCREEN W REFLEX-IMP: ABNORMAL
MIXING DRVVT: 59.4 SEC (ref 0–40.4)
RESULT COMMENT:: NORMAL
RICK SF IGG TITR SER: NORMAL {TITER}
RICK SF IGM TITR SER: NORMAL {TITER}
SARS-COV-2 AB SERPL-IMP: POSITIVE
SARS-COV-2 IGG SERPL IA-ACNC: 553 AU/ML
SCREEN APTT: 35.6 SEC (ref 0–43.5)
SCREEN DRVVT: 78.4 SEC (ref 0–47)
THROMBIN TIME: 20.8 SEC (ref 0–23)
WNV IGG SER QL IA: NEGATIVE
WNV IGM SER QL IA: NEGATIVE

## 2025-05-22 LAB
IMP & REVIEW OF LAB RESULTS: NORMAL
MTHFR GENE MUT ANL BLD/T: NORMAL
SARS-COV-2 AB SERPL IA-ACNC: 4605 U/ML
SARS-COV-2 AB SERPL IA-ACNC: NORMAL U/ML
SARS-COV-2 AB SERPL-IMP: POSITIVE